# Patient Record
Sex: FEMALE | Race: BLACK OR AFRICAN AMERICAN | Employment: UNEMPLOYED | URBAN - METROPOLITAN AREA
[De-identification: names, ages, dates, MRNs, and addresses within clinical notes are randomized per-mention and may not be internally consistent; named-entity substitution may affect disease eponyms.]

---

## 2023-12-08 ENCOUNTER — HOSPITAL ENCOUNTER (EMERGENCY)
Facility: HOSPITAL | Age: 53
Discharge: HOME | End: 2023-12-08
Attending: STUDENT IN AN ORGANIZED HEALTH CARE EDUCATION/TRAINING PROGRAM
Payer: MEDICAID

## 2023-12-08 VITALS
SYSTOLIC BLOOD PRESSURE: 153 MMHG | HEIGHT: 66 IN | RESPIRATION RATE: 16 BRPM | DIASTOLIC BLOOD PRESSURE: 77 MMHG | WEIGHT: 216 LBS | OXYGEN SATURATION: 100 % | TEMPERATURE: 98.1 F | HEART RATE: 76 BPM | BODY MASS INDEX: 34.72 KG/M2

## 2023-12-08 DIAGNOSIS — R20.2 NUMBNESS AND TINGLING OF LEFT LOWER EXTREMITY: ICD-10-CM

## 2023-12-08 DIAGNOSIS — R20.0 NUMBNESS AND TINGLING OF LEFT LOWER EXTREMITY: ICD-10-CM

## 2023-12-08 DIAGNOSIS — M54.50 ACUTE LEFT-SIDED LOW BACK PAIN WITHOUT SCIATICA: Primary | ICD-10-CM

## 2023-12-08 LAB
ALBUMIN SERPL-MCNC: 4 G/DL (ref 3.5–5.2)
ALBUMIN/GLOB SERPL: 1.3 (ref 1.1–2.2)
ALP SERPL-CCNC: 53 U/L (ref 35–104)
ALT SERPL-CCNC: 11 U/L (ref 10–35)
ANION GAP SERPL CALC-SCNC: 9 MMOL/L (ref 5–15)
APPEARANCE UR: CLEAR
AST SERPL-CCNC: 16 U/L (ref 10–35)
BACTERIA URNS QL MICRO: NEGATIVE /HPF
BILIRUB SERPL-MCNC: 0.4 MG/DL (ref 0.2–1)
BILIRUB UR QL: NEGATIVE
BUN SERPL-MCNC: 7 MG/DL (ref 6–20)
BUN/CREAT SERPL: 11 (ref 12–20)
CALCIUM SERPL-MCNC: 8.9 MG/DL (ref 8.6–10)
CHLORIDE SERPL-SCNC: 103 MMOL/L (ref 98–107)
CO2 SERPL-SCNC: 26 MMOL/L (ref 22–29)
COLOR UR: NORMAL
CREAT SERPL-MCNC: 0.63 MG/DL (ref 0.5–0.9)
EPITH CASTS URNS QL MICRO: NORMAL /LPF
GLOBULIN SER CALC-MCNC: 3.2 G/DL (ref 2–4)
GLUCOSE SERPL-MCNC: 104 MG/DL (ref 65–100)
GLUCOSE UR STRIP.AUTO-MCNC: NEGATIVE MG/DL
HGB UR QL STRIP: NEGATIVE
KETONES UR QL STRIP.AUTO: NEGATIVE MG/DL
LEUKOCYTE ESTERASE UR QL STRIP.AUTO: NEGATIVE
MAGNESIUM SERPL-MCNC: 1.7 MG/DL (ref 1.6–2.6)
NITRITE UR QL STRIP.AUTO: NEGATIVE
PH UR STRIP: 7 (ref 5–8)
POTASSIUM SERPL-SCNC: 4.1 MMOL/L (ref 3.5–5.1)
PROT SERPL-MCNC: 7.2 G/DL (ref 6.4–8.3)
PROT UR STRIP-MCNC: NEGATIVE MG/DL
RBC #/AREA URNS HPF: NORMAL /HPF
SODIUM SERPL-SCNC: 138 MMOL/L (ref 136–145)
SP GR UR REFRACTOMETRY: 1.01 (ref 1–1.03)
URINE CULTURE IF INDICATED: NORMAL
UROBILINOGEN UR QL STRIP.AUTO: 0.2 EU/DL (ref 0.2–1)
WBC URNS QL MICRO: NORMAL /HPF (ref 0–4)

## 2023-12-08 PROCEDURE — 6360000002 HC RX W HCPCS

## 2023-12-08 PROCEDURE — 6370000000 HC RX 637 (ALT 250 FOR IP): Performed by: STUDENT IN AN ORGANIZED HEALTH CARE EDUCATION/TRAINING PROGRAM

## 2023-12-08 PROCEDURE — 81001 URINALYSIS AUTO W/SCOPE: CPT

## 2023-12-08 PROCEDURE — 83735 ASSAY OF MAGNESIUM: CPT

## 2023-12-08 PROCEDURE — 6370000000 HC RX 637 (ALT 250 FOR IP)

## 2023-12-08 PROCEDURE — 80053 COMPREHEN METABOLIC PANEL: CPT

## 2023-12-08 PROCEDURE — 96374 THER/PROPH/DIAG INJ IV PUSH: CPT

## 2023-12-08 PROCEDURE — 99284 EMERGENCY DEPT VISIT MOD MDM: CPT

## 2023-12-08 PROCEDURE — 36415 COLL VENOUS BLD VENIPUNCTURE: CPT

## 2023-12-08 RX ORDER — CYCLOBENZAPRINE HCL 5 MG
5 TABLET ORAL 2 TIMES DAILY PRN
Qty: 10 TABLET | Refills: 0 | Status: SHIPPED | OUTPATIENT
Start: 2023-12-08 | End: 2023-12-18

## 2023-12-08 RX ORDER — LIDOCAINE 4 G/G
1 PATCH TOPICAL
Status: DISCONTINUED | OUTPATIENT
Start: 2023-12-08 | End: 2023-12-08 | Stop reason: HOSPADM

## 2023-12-08 RX ORDER — CYCLOBENZAPRINE HCL 10 MG
10 TABLET ORAL
Status: COMPLETED | OUTPATIENT
Start: 2023-12-08 | End: 2023-12-08

## 2023-12-08 RX ORDER — KETOROLAC TROMETHAMINE 30 MG/ML
15 INJECTION, SOLUTION INTRAMUSCULAR; INTRAVENOUS
Status: COMPLETED | OUTPATIENT
Start: 2023-12-08 | End: 2023-12-08

## 2023-12-08 RX ORDER — LIDOCAINE 50 MG/G
1 PATCH TOPICAL DAILY
Qty: 10 PATCH | Refills: 0 | Status: SHIPPED | OUTPATIENT
Start: 2023-12-08 | End: 2023-12-18

## 2023-12-08 RX ORDER — KETOROLAC TROMETHAMINE 30 MG/ML
30 INJECTION, SOLUTION INTRAMUSCULAR; INTRAVENOUS
Status: DISCONTINUED | OUTPATIENT
Start: 2023-12-08 | End: 2023-12-08

## 2023-12-08 RX ORDER — ACETAMINOPHEN 500 MG
1000 TABLET ORAL
Status: COMPLETED | OUTPATIENT
Start: 2023-12-08 | End: 2023-12-08

## 2023-12-08 RX ADMIN — KETOROLAC TROMETHAMINE 15 MG: 30 INJECTION, SOLUTION INTRAMUSCULAR; INTRAVENOUS at 10:44

## 2023-12-08 RX ADMIN — ACETAMINOPHEN 1000 MG: 500 TABLET ORAL at 10:41

## 2023-12-08 RX ADMIN — CYCLOBENZAPRINE 10 MG: 10 TABLET, FILM COATED ORAL at 10:51

## 2023-12-08 ASSESSMENT — PAIN SCALES - GENERAL
PAINLEVEL_OUTOF10: 10
PAINLEVEL_OUTOF10: 10

## 2023-12-08 ASSESSMENT — ENCOUNTER SYMPTOMS: BACK PAIN: 1

## 2023-12-08 NOTE — ED TRIAGE NOTES
Pt arrives w/ cc of back and leg pain. Pt reports acute on chronic back pain, with no recent injury. Pt also reports that this morning she has developed tingling in her left foot, and alteration to her urinary sensation, but no loss of continence. Pt also reports difficulty establishing primary care.

## 2023-12-08 NOTE — ED PROVIDER NOTES
[facility] EMERGENCY DEPT  EMERGENCY DEPARTMENT ENCOUNTER      Pt Name: Terry Salvador  MRN: [id number]  [address] 1970  Date of evaluation: 12/8/2023  Provider: Bridgett Mckenna DO    CHIEF COMPLAINT     No chief complaint on file. HISTORY OF PRESENT ILLNESS   (Location/Symptom, Timing/Onset, Context/Setting, Quality, Duration, Modifying Factors, Severity)  Note limiting factors. 48year old with chronic back pain who presents to the ER with acute worsening of her low back pain and tingling in her left toes. Differential includes sprain, strain, fracture, sciatica. She denies trauma or injury. She endorses urinary urgency but no dysuria. She denies fevers, unintentional weight loss, saddle anesthesia, incontinence. She has taken muscle relaxants for the pain in the past. She is having difficulty with ambulation due to her back pain but not weakness. Her back pain does not radiate down her legs, it is midline and left sided lumbar. Review of External Medical Records:     Nursing Notes were reviewed. REVIEW OF SYSTEMS    (2-9 systems for level 4, 10 or more for level 5)     Review of Systems   Constitutional:  Negative for fever and unexpected weight change. Genitourinary:  Positive for urgency. Negative for dysuria. Musculoskeletal:  Positive for arthralgias, back pain and gait problem. Except as noted above the remainder of the review of systems was reviewed and negative. PAST MEDICAL HISTORY   No past medical history on file. SURGICAL HISTORY     No past surgical history on file. CURRENT MEDICATIONS       Discharge Medication List as of 12/8/2023 12:59 PM          ALLERGIES     Oxycodone-acetaminophen    FAMILY HISTORY     No family history on file.        SOCIAL HISTORY       Social History     Socioeconomic History    Marital status: Unknown           PHYSICAL EXAM    (up to 7 for level 4, 8 or more for level 5)     ED Triage Vitals [12/08/23 1000]   BP

## 2023-12-08 NOTE — ED NOTES
Patient does not appear to be in any acute distress/shows no evidence of clinical instability at this time. Reviewed discharge instructions, prescriptions, education and follow up information with patient. Patient verbalizing understanding. Patient at baseline cardiac, respiratory, and neuro function. Pain controlled. Patient left ER under baseline transfer modality.        Chidi Whiteside RN  12/08/23 [id number]

## 2025-01-02 ENCOUNTER — OFFICE VISIT (OUTPATIENT)
Age: 55
End: 2025-01-02

## 2025-01-02 VITALS
SYSTOLIC BLOOD PRESSURE: 153 MMHG | OXYGEN SATURATION: 98 % | DIASTOLIC BLOOD PRESSURE: 87 MMHG | RESPIRATION RATE: 16 BRPM | TEMPERATURE: 97.8 F | HEART RATE: 76 BPM

## 2025-01-02 DIAGNOSIS — R10.11 RIGHT UPPER QUADRANT ABDOMINAL PAIN: Primary | ICD-10-CM

## 2025-01-02 DIAGNOSIS — R19.4 CHANGE IN STOOL HABITS: ICD-10-CM

## 2025-01-02 DIAGNOSIS — F41.9 ANXIETY: ICD-10-CM

## 2025-01-02 PROBLEM — F32.A DEPRESSIVE DISORDER: Status: ACTIVE | Noted: 2022-09-23

## 2025-01-02 PROBLEM — N88.2 STENOSIS OF CERVIX: Status: ACTIVE | Noted: 2025-01-02

## 2025-01-02 PROBLEM — N93.9 ABNORMAL UTERINE BLEEDING: Status: ACTIVE | Noted: 2022-06-06

## 2025-01-02 PROBLEM — L03.317 CELLULITIS AND ABSCESS OF BUTTOCK: Status: ACTIVE | Noted: 2024-03-15

## 2025-01-02 PROBLEM — L02.31 CELLULITIS AND ABSCESS OF BUTTOCK: Status: ACTIVE | Noted: 2024-03-15

## 2025-01-02 PROBLEM — N92.6 IRREGULAR PERIODS: Status: ACTIVE | Noted: 2025-01-02

## 2025-01-02 PROBLEM — D21.9 LEIOMYOMA: Status: ACTIVE | Noted: 2025-01-02

## 2025-01-02 RX ORDER — HYDROXYZINE HYDROCHLORIDE 25 MG/1
25-50 TABLET, FILM COATED ORAL EVERY 8 HOURS PRN
Qty: 30 TABLET | Refills: 0 | Status: SHIPPED | OUTPATIENT
Start: 2025-01-02

## 2025-01-02 RX ORDER — DICYCLOMINE HCL 20 MG
20 TABLET ORAL 4 TIMES DAILY PRN
Qty: 30 TABLET | Refills: 0 | Status: SHIPPED | OUTPATIENT
Start: 2025-01-02

## 2025-01-02 NOTE — PATIENT INSTRUCTIONS
Recommend making that appointment for colonoscopy and following up closing with PCP for these ongoing recurrent symptoms.  Keep diet light with small meals through the day avoiding fatting and fried foods.  Read printed education. May try the Hydroxyzine for anxiety - be careful as this medication can make you sleepy.  Make sure to hydrate well daily.  Return to clinic as needed but important to follow up with PCP.  Go to ER if high fever, severe persisting or worsening abdominal pain, dehydration or any new concerning symptoms.

## 2025-01-02 NOTE — PROGRESS NOTES
Patient Name: Ly Mendez   YOB: 1970   Patient Status: New patient,   Chief Complaint: Abdominal Pain (Right side pain x 1 year . Bottom of rib level and around to back, pain to stand up straight, worse with anxiety, comes and goes randomly. Some nausea and BM related issues. )      ____________________________________________________________________________________________    External Records Reviewed: None    Limitation to History: None    Outside Historian: None    SUBJECTIVE/OBJECTIVE:  Ly Mendez is a 54 y.o. female presents with complaint of right upper abdominal pain intermittently for about 1 year worse when anxious.  She reports that she was really anxious this morning and pain got much worse.  She reports she has discussed with PCP who told her she had a lipoma and that they would also be watching her kidneys or liver - she is unsure.  She reports gallbladder removed years ago. Her PCP ordered a colonoscopy but she has not been able to schedule due to family conflicts to help with someone being present for procedure. She reports recently she has had constipation intermittently with then soft stools   Patient describes pain as sharp lasting minutes usually, 7/10 in severity,intermittent and without radiation.  Symptoms are aggravated by movement and anxiety and alleviated by nothing The patient denies fever, shortness of breath, chest pain, and urinary symptoms. She denies blood in stool or dark or tarry stools. She denies nausea or vomiting.  No other acute symptoms reported at this time.          PAST MEDICAL HISTORY:   Medical: Pt  has no past medical history on file.  Surgical: Pt  has no past surgical history on file.  Family: Pt family history is not on file.  Social: Pt   Social History     Socioeconomic History    Marital status: Unknown     Spouse name: Not on file    Number of children: Not on file    Years of education: Not on file    Highest education

## 2025-01-23 ENCOUNTER — TELEPHONE (OUTPATIENT)
Age: 55
End: 2025-01-23

## 2025-01-23 NOTE — TELEPHONE ENCOUNTER
Received referral from primary care. Called patient to schedule. Left message for her to call the office

## 2025-04-18 ENCOUNTER — TELEPHONE (OUTPATIENT)
Age: 55
End: 2025-04-18

## 2025-04-18 ENCOUNTER — OFFICE VISIT (OUTPATIENT)
Age: 55
End: 2025-04-18
Payer: COMMERCIAL

## 2025-04-18 VITALS
BODY MASS INDEX: 34.23 KG/M2 | HEIGHT: 66 IN | DIASTOLIC BLOOD PRESSURE: 93 MMHG | SYSTOLIC BLOOD PRESSURE: 171 MMHG | WEIGHT: 213 LBS | OXYGEN SATURATION: 98 % | HEART RATE: 76 BPM

## 2025-04-18 DIAGNOSIS — Z12.4 ENCOUNTER FOR SCREENING FOR MALIGNANT NEOPLASM OF CERVIX: ICD-10-CM

## 2025-04-18 DIAGNOSIS — Z01.419 GYNECOLOGIC EXAM NORMAL: Primary | ICD-10-CM

## 2025-04-18 DIAGNOSIS — Z12.39 SCREENING BREAST EXAMINATION: ICD-10-CM

## 2025-04-18 DIAGNOSIS — R10.2 PELVIC PAIN: ICD-10-CM

## 2025-04-18 PROCEDURE — 99386 PREV VISIT NEW AGE 40-64: CPT | Performed by: OBSTETRICS & GYNECOLOGY

## 2025-04-18 RX ORDER — ERGOCALCIFEROL 1.25 MG/1
50000 CAPSULE, LIQUID FILLED ORAL WEEKLY
COMMUNITY
Start: 2025-04-10

## 2025-04-18 SDOH — ECONOMIC STABILITY: FOOD INSECURITY: WITHIN THE PAST 12 MONTHS, THE FOOD YOU BOUGHT JUST DIDN'T LAST AND YOU DIDN'T HAVE MONEY TO GET MORE.: NEVER TRUE

## 2025-04-18 SDOH — ECONOMIC STABILITY: FOOD INSECURITY: WITHIN THE PAST 12 MONTHS, YOU WORRIED THAT YOUR FOOD WOULD RUN OUT BEFORE YOU GOT MONEY TO BUY MORE.: NEVER TRUE

## 2025-04-18 ASSESSMENT — PATIENT HEALTH QUESTIONNAIRE - PHQ9
SUM OF ALL RESPONSES TO PHQ QUESTIONS 1-9: 0
SUM OF ALL RESPONSES TO PHQ QUESTIONS 1-9: 0
2. FEELING DOWN, DEPRESSED OR HOPELESS: NOT AT ALL
SUM OF ALL RESPONSES TO PHQ QUESTIONS 1-9: 0
SUM OF ALL RESPONSES TO PHQ QUESTIONS 1-9: 0
1. LITTLE INTEREST OR PLEASURE IN DOING THINGS: NOT AT ALL

## 2025-04-23 LAB
., LABCORP: ABNORMAL
CYTOLOGIST CVX/VAG CYTO: ABNORMAL
CYTOLOGY CVX/VAG DOC CYTO: ABNORMAL
CYTOLOGY CVX/VAG DOC THIN PREP: ABNORMAL
DX ICD CODE: ABNORMAL
DX ICD CODE: ABNORMAL
HPV I/H RISK 4 DNA CVX QL PROBE+SIG AMP: NEGATIVE
OTHER STN SPEC: ABNORMAL
PATHOLOGIST CVX/VAG CYTO: ABNORMAL
SERVICE CMNT-IMP: ABNORMAL
STAT OF ADQ CVX/VAG CYTO-IMP: ABNORMAL

## 2025-04-29 ENCOUNTER — RESULTS FOLLOW-UP (OUTPATIENT)
Age: 55
End: 2025-04-29

## 2025-04-29 NOTE — PROGRESS NOTES
Transportation Needs (4/18/2025)    PRAPARE - Transportation    • Lack of Transportation (Medical): No    • Lack of Transportation (Non-Medical): No   Physical Activity: Not on file   Stress: Not on file   Social Connections: Not on file   Intimate Partner Violence: Not on file   Housing Stability: Unknown (4/18/2025)    Housing Stability Vital Sign    • Unable to Pay for Housing in the Last Year: No    • Number of Times Moved in the Last Year: Not on file    • Homeless in the Last Year: No         HPI  Annual     Review of Systems   All other systems reviewed and are negative.       BP (!) 171/93 (BP Site: Right Upper Arm, Patient Position: Sitting)   Pulse 76   Ht 1.676 m (5' 6\")   Wt 96.6 kg (213 lb)   LMP 02/11/2025   SpO2 98%   BMI 34.38 kg/m²   OBGyn Exam   Constitutional:     General Appearance: healthy-appearing, well-nourished, and well-developed;  Level of Distress: NAD. Ambulation: ambulating normally.     Psychiatric:   Insight: good judgement.   Mental Status: normal mood and affect and active and alert.   Orientation: to time, place, and person.   Memory: recent memory normal and remote memory normal.     Head: Head: normocephalic and atraumatic.     Neck:   Neck: supple, FROM, trachea midline, and no masses.   Lymph Nodes: no cervical LAD, supraclavicular LAD, axillary LAD, or inguinal LAD.       Lungs:   Respiratory effort: no dyspnea.       Cardiovascular:     Pulses including femoral / pedal: normal throughout.     Breast: Breast: no masses or abnormal secretions and normal appearance.     Abdomen:   no tenderness, guarding, masses, rebound tenderness, or CVA tenderness and non-distended.       Female :   External genitalia: no lesions or rash and normal.   Vagina: moist mucosa;   Cervix: no discharge, inflammation, or cervical motion tenderness   Uterus: midline, smooth, and non-tender; normal size   Adnexae: no adnexal mass or tenderness and size WNL.   Bladder and Urethra: normal bladder